# Patient Record
Sex: FEMALE
[De-identification: names, ages, dates, MRNs, and addresses within clinical notes are randomized per-mention and may not be internally consistent; named-entity substitution may affect disease eponyms.]

---

## 2024-01-29 ENCOUNTER — APPOINTMENT (OUTPATIENT)
Dept: ORTHOPEDIC SURGERY | Facility: CLINIC | Age: 59
End: 2024-01-29
Payer: MEDICAID

## 2024-01-29 VITALS — HEIGHT: 62 IN | BODY MASS INDEX: 31.28 KG/M2 | WEIGHT: 170 LBS

## 2024-01-29 DIAGNOSIS — M17.12 UNILATERAL PRIMARY OSTEOARTHRITIS, LEFT KNEE: ICD-10-CM

## 2024-01-29 DIAGNOSIS — M23.204 DERANGEMENT OF UNSPECIFIED MEDIAL MENISCUS DUE TO OLD TEAR OR INJURY, LEFT KNEE: ICD-10-CM

## 2024-01-29 DIAGNOSIS — I10 ESSENTIAL (PRIMARY) HYPERTENSION: ICD-10-CM

## 2024-01-29 DIAGNOSIS — S83.232S COMPLEX TEAR OF MEDIAL MENISCUS, CURRENT INJURY, LEFT KNEE, SEQUELA: ICD-10-CM

## 2024-01-29 PROBLEM — Z00.00 ENCOUNTER FOR PREVENTIVE HEALTH EXAMINATION: Status: ACTIVE | Noted: 2024-01-29

## 2024-01-29 PROCEDURE — 73564 X-RAY EXAM KNEE 4 OR MORE: CPT | Mod: LT

## 2024-01-29 PROCEDURE — 99204 OFFICE O/P NEW MOD 45 MIN: CPT

## 2024-01-29 RX ORDER — LEVOTHYROXINE SODIUM 0.07 MG/1
75 TABLET ORAL
Refills: 0 | Status: ACTIVE | COMMUNITY

## 2024-01-29 RX ORDER — LOSARTAN POTASSIUM 100 MG/1
TABLET, FILM COATED ORAL
Refills: 0 | Status: ACTIVE | COMMUNITY

## 2024-01-29 RX ORDER — AMLODIPINE BESYLATE 5 MG/1
5 TABLET ORAL
Refills: 0 | Status: ACTIVE | COMMUNITY

## 2024-01-29 RX ORDER — MELOXICAM 7.5 MG/1
7.5 TABLET ORAL
Qty: 30 | Refills: 0 | Status: ACTIVE | COMMUNITY
Start: 2024-01-29 | End: 1900-01-01

## 2024-01-29 NOTE — IMAGING
[de-identified] : LEFT KNEE  Inspection:  mild effusion  Palpation: no medial joint line tenderness   Knee Range of Motion:  0-130   Strength: 5/5 Quadriceps strength, 5/5 Hamstring strength  Neurological: light touch is intact throughout  Ligament Stability and Special Tests:   McMurrays: negative Lachman: neg  Pivot Shift: neg  Posterior Drawer: neg  Valgus: neg  Varus: neg  Patella Apprehension: neg  Patella Maltracking: neg

## 2024-01-29 NOTE — DATA REVIEWED
[FreeTextEntry1] : 3 views left knee performed at [facility]: This films independently interpreted by myself.  They are negative for fracture or dislocation.  There is tricompartmental mild osteoarthritis appreciated.  Marginal osteophytes appreciated.  MRI performed at [facility]: These films independently interpreted by myself.  There is evidence of a radial tear of the medial meniscus at the posterior horn with no evidence of associated bone marrow edema.  There is significant chondral loss in the medial and lateral compartment mild to moderate in the lateral compartment moderate to severe in the medial and patellofemoral compartments.  There is evidence of extrusion of meniscus.  Is well as an associated Baker's cyst.

## 2024-01-29 NOTE — HISTORY OF PRESENT ILLNESS
[8] : 8 [Sharp] : sharp [Stabbing] : stabbing [Tightness] : tightness [Constant] : constant [Nothing helps with pain getting better] : Nothing helps with pain getting better [Sitting] : sitting [Standing] : standing [Walking] : walking [Stairs] : stairs [de-identified] : Pt is a 58 year old female presenting with left knee pain beginning 1 year ago without injury.  pain and stiffness worsening within the past 6 months  Patient seen and examined.  Patient presents today with a acute on chronic exacerbation of left knee pain.  Patient denies any particular trauma injury or accident.  She states she had a weird twisting motion that she thinks that it off.  She states she has had on and off pain for the last 2 years with this knee.  She was finally evaluated by her PCP who ordered an MRI and then told her to follow-up with myself.  She has not been seen or evaluated by any other doctors had formal treatment, therapy injections or surgery.  She is here for further evaluation management. [] : no [FreeTextEntry1] : left knee

## 2024-01-29 NOTE — DISCUSSION/SUMMARY
[de-identified] :  Patient seen patient seen and examined.  Patient presents today for evaluation of acute on chronic left knee pain.  Based on her history, physical examination, imaging I discussed with her that her symptoms are suggestive of a posterior root tear of her medial meniscus with associated extrusion, of the medial meniscus, with mild degenerative changes however there is still some joint space appreciated on her standing x-rays.  We discussed the treatment options utilizing a ladder analogy with first-line treatment options being nonsteroidal anti-inflammatories and physical therapy to focus on the kinetic chain.  We discussed the path mechanics and pathophysiology of this injury and discussed the fact that when the patient has this sort of tear the meniscus is functionally incompetent.  If the meniscus is incompetent she is at increased risk for compartment overload as well as subchondral insufficiency fractures and a significant progression of arthrosis.  However due to the fact that she has underlying degenerative changes in the setting of this injury being approximately 12-16 months old.  We discussed that if she has mechanical symptoms secondary to entrapment within the meniscal tibial recess sometimes those symptoms can be improved in the short-term with a partial medial meniscectomy however they accelerate her risk of needing a joint replacement compared to nonoperative measures.  However sometimes they are necessary secondary to pain.  Discussed that the last rung on the ladder is a total knee replacement.  We discussed that total knee replacements are generally reserved for patients with end-stage arthrosis who are in capable of performing ADLs and recreational activities.  I do not believe she is at that point.  To start we will initiate treatment with nonsteroidal anti-inflammatories as well as physical therapy.  I discussed with her that we can incorporate in the future a one-time injection of the low-dose steroid again for the treatment of arthrosis however if we were to do that I would put off any surgery for 3 months to decrease any risk associated with wound healing or infection.  All questions were asked and answered.  Patient is in agreement with the plan.  Patient will plan to follow-up accordingly.  We discussed the risk and benefits of nonsteroidal anti-inflammatories which include but not limited to GI issues, kidney issues, bleeding issues.  Patient will follow-up with me in approximately 4 weeks time.  Plan for next visit: 1.  Assess knee pain and range of motion, consider CSI versus surgical planning.

## 2024-03-21 ENCOUNTER — APPOINTMENT (OUTPATIENT)
Dept: ORTHOPEDIC SURGERY | Facility: CLINIC | Age: 59
End: 2024-03-21